# Patient Record
Sex: FEMALE | Race: WHITE | NOT HISPANIC OR LATINO
[De-identification: names, ages, dates, MRNs, and addresses within clinical notes are randomized per-mention and may not be internally consistent; named-entity substitution may affect disease eponyms.]

---

## 2019-06-05 ENCOUNTER — APPOINTMENT (OUTPATIENT)
Dept: PODIATRY | Facility: CLINIC | Age: 74
End: 2019-06-05
Payer: MEDICARE

## 2019-06-05 VITALS
HEIGHT: 62 IN | WEIGHT: 120 LBS | SYSTOLIC BLOOD PRESSURE: 134 MMHG | BODY MASS INDEX: 22.08 KG/M2 | DIASTOLIC BLOOD PRESSURE: 70 MMHG

## 2019-06-05 DIAGNOSIS — M24.575 CONTRACTURE, LEFT FOOT: ICD-10-CM

## 2019-06-05 DIAGNOSIS — M20.12 HALLUX VALGUS (ACQUIRED), LEFT FOOT: ICD-10-CM

## 2019-06-05 DIAGNOSIS — M20.42 OTHER HAMMER TOE(S) (ACQUIRED), LEFT FOOT: ICD-10-CM

## 2019-06-05 DIAGNOSIS — M24.574 CONTRACTURE, RIGHT FOOT: ICD-10-CM

## 2019-06-05 DIAGNOSIS — M20.11 HALLUX VALGUS (ACQUIRED), RIGHT FOOT: ICD-10-CM

## 2019-06-05 DIAGNOSIS — M20.41 OTHER HAMMER TOE(S) (ACQUIRED), RIGHT FOOT: ICD-10-CM

## 2019-06-05 PROBLEM — Z00.00 ENCOUNTER FOR PREVENTIVE HEALTH EXAMINATION: Status: ACTIVE | Noted: 2019-06-05

## 2019-06-05 PROCEDURE — 99203 OFFICE O/P NEW LOW 30 MIN: CPT

## 2019-06-05 RX ORDER — TIOTROPIUM BROMIDE 18 UG/1
18 CAPSULE ORAL; RESPIRATORY (INHALATION)
Qty: 90 | Refills: 0 | Status: ACTIVE | COMMUNITY
Start: 2019-03-04

## 2019-06-05 RX ORDER — TRAZODONE HYDROCHLORIDE 50 MG/1
50 TABLET ORAL
Qty: 30 | Refills: 0 | Status: ACTIVE | COMMUNITY
Start: 2019-05-02

## 2019-06-05 RX ORDER — CARBIDOPA AND LEVODOPA 25; 100 MG/1; MG/1
25-100 TABLET ORAL
Qty: 270 | Refills: 0 | Status: ACTIVE | COMMUNITY
Start: 2019-04-30

## 2019-06-05 RX ORDER — MONTELUKAST 10 MG/1
10 TABLET, FILM COATED ORAL
Qty: 30 | Refills: 0 | Status: ACTIVE | COMMUNITY
Start: 2019-03-04

## 2019-06-05 RX ORDER — ALBUTEROL SULFATE 2.5 MG/3ML
(2.5 MG/3ML) SOLUTION RESPIRATORY (INHALATION)
Qty: 525 | Refills: 0 | Status: ACTIVE | COMMUNITY
Start: 2018-06-11

## 2019-06-05 RX ORDER — ALENDRONATE SODIUM 70 MG/1
70 TABLET ORAL
Qty: 12 | Refills: 0 | Status: ACTIVE | COMMUNITY
Start: 2019-05-23

## 2019-06-05 RX ORDER — AZITHROMYCIN 250 MG/1
250 TABLET, FILM COATED ORAL
Qty: 5 | Refills: 0 | Status: ACTIVE | COMMUNITY
Start: 2019-02-16

## 2019-06-05 NOTE — PROCEDURE
[FreeTextEntry1] : had a lengthy discussion with the patient regarding the diagnosis etiology and differential diagnosis as well as treatment options for the presenting problem. Risks alternatives and benefits of treatment ranging from conservative to surgical explained in great detail. I also explained the progression of treatment from conservative to possible surgical treatment options as well as the benefits of each. I do stress conservative treatment if in fact conservative treatment is an option until it no longer provides relief. Over-the-counter products medications padding, and splinting were reviewed as well. All questions asked and answered appropriately to the patient's satisfaction\par I had a lengthy discussion with the patient regarding surgical as well as conservative options. Conservative options included anti-inflammatory medication both over-the-counter and prescription. Other conservative measures including alteration of shoe gear as well as pads to go over the painful areas. Silipos shields were supplied for all bony pressure areas of pain. I then had a lengthy discussion with the patient regarding surgical intervention. I used x-rays bone models an erasable wax board and their own foot in the discussion. Educational literature was also dispensed. I explained surgical intervention as well as the risks alternatives and benefits to surgical intervention. I explained that it is elective surgery and the decision to continue would be theirs. I also explained that there is an option of no surgery and all. During my surgical evaluation and consultation, I explained the need for cutting and removal and possibly repositioning of bone. I discussed the difference between simply cutting bone and removing the painful bone and in the case of more severe deformities there is also the option of cutting repositioning and fixating the cut bone with either wire screw or staple or a combination of both 3. I explained to the patient that these decisions are usually made during the surgery however I explained to the patient given my findings today the anticipated but not definitive surgical procedure. I explained to the patient there are always choice is to be made during the surgery that sometimes we cannot predict prior to the surgery. Some of the risks explained were, but not limited to, infection recurrence of deformity chronic pain chronic swelling nerve injury injury to the blood vessels as well as the possible need for further surgery should the original procedure not entirely correct or overcorrect the problem. I explained the post operative course as well as the need to be in either a special surgical boot or a surgical shoe postop. I explained to the patient that the duration of time would be approximately 4-6 weeks and sometimes longer in this special type of postop shoe. I also explained to the patient that there would be a need for physical therapy after the surgery in order to decrease swelling and help increase and improve range of motion at the surgical sites. Postoperative compliance regarding dressing changes the type of shoes that they should be wearing compliance with physical therapy as well as keeping all postop appointments were stressed on several occasions. I explained to the patient there is no way to tell preoperatively how much swelling and pain it will have postop and this will directly relate to when they can get back into a shoe comfortably\par The patient was also told that formal Physical therapy will be required post op.\par The patient was told that in some cases wires/pins need to be implanted which can either be permanent or removable however the first choice  is always compression screws but when this is not possible either to poor bone bone stock or other unforeseen findings intraoperatively that K wire fixation is there is a possibility and this could delay healing. The patient was also to some cases been anticipated they will be required to use crutches postop. The patient was told that there is some intra-operative instances where they cannot be predicted preoperatively but there is a possibility.\par I Had a lengthy discussion with the patient today regarding the etiology his as well as treatment options for hammertoe deformity both flexible and rigid. At this point in time I feel it is totally acceptable to consider a less aggressive approach and to do an open tenotomy through a small incision on the dorsum of the foot which involves no cutting of bone is a simple soft tissue correction which can lower the toe.  I had a lengthy discussion with the patient regarding the risks and benefits of such a procedure including the similar risks of more aggressive hammer toe surgery with resection of the offending proximal phalanx down the road if this conservative approach fails to heal the problem. I did explain the risks and benefits of this type of surgery which are very much similar to any other type of surgical procedure. Include infection, recurrence of deformity, the condition no better, infection that could require long-term antibiotics, and infection that could require hospitalization and infection getting worse and affecting the bone and in some rare cases could result in the patient could lose the toe. All questions asked and answered appropriately\par

## 2019-06-05 NOTE — HISTORY OF PRESENT ILLNESS
[FreeTextEntry1] : Location: 2nd toe right foot\par Duration: a few months\par Acute: yes\par Chronic:\par Past Tx: has had sx repair of 2nd toe right foot--16 yrs ago\par Exacerbated by: walking and closed shoes\par \par \par

## 2019-06-05 NOTE — REVIEW OF SYSTEMS
[Shortness Of Breath] : shortness of breath [Wheezing] : wheezing [As Noted in HPI] : as noted in HPI [Joint Pain] : joint pain [Joint Swelling] : joint swelling [Joint Stiffness] : joint stiffness [Negative] : Integumentary [Cough] : no cough [Skin Lesions] : no skin lesions [Skin Wound] : no skin wound [FreeTextEntry6] : COPD [FreeTextEntry9] : 2nd toe right foot and lesser to 3/4 right

## 2019-06-05 NOTE — PHYSICAL EXAM
[General Appearance - Alert] : alert [General Appearance - In No Acute Distress] : in no acute distress [Full Pulse] : the pedal pulses are present [Edema] : there was no peripheral edema [Skin Color & Pigmentation] : normal skin color and pigmentation [Skin Turgor] : normal skin turgor [] : no rash [Deep Tendon Reflexes (DTR)] : deep tendon reflexes were 2+ and symmetric [Sensation] : the sensory exam was normal to light touch and pinprick [No Focal Deficits] : no focal deficits [Oriented To Time, Place, And Person] : oriented to person, place, and time [Impaired Insight] : insight and judgment were intact [Affect] : the affect was normal [FreeTextEntry1] : 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

## 2023-01-23 ENCOUNTER — TRANSCRIPTION ENCOUNTER (OUTPATIENT)
Age: 78
End: 2023-01-23

## 2023-01-24 ENCOUNTER — TRANSCRIPTION ENCOUNTER (OUTPATIENT)
Age: 78
End: 2023-01-24

## 2023-08-14 ENCOUNTER — TRANSCRIPTION ENCOUNTER (OUTPATIENT)
Age: 78
End: 2023-08-14

## 2023-09-22 ENCOUNTER — APPOINTMENT (OUTPATIENT)
Dept: THORACIC SURGERY | Facility: CLINIC | Age: 78
End: 2023-09-22
Payer: MEDICARE

## 2023-09-22 PROCEDURE — 99204 OFFICE O/P NEW MOD 45 MIN: CPT

## 2023-12-15 ENCOUNTER — APPOINTMENT (OUTPATIENT)
Dept: THORACIC SURGERY | Facility: CLINIC | Age: 78
End: 2023-12-15
Payer: MEDICARE

## 2023-12-15 PROCEDURE — 99213 OFFICE O/P EST LOW 20 MIN: CPT

## 2024-02-01 ENCOUNTER — TRANSCRIPTION ENCOUNTER (OUTPATIENT)
Age: 79
End: 2024-02-01

## 2024-02-05 ENCOUNTER — APPOINTMENT (OUTPATIENT)
Dept: PULMONOLOGY | Facility: CLINIC | Age: 79
End: 2024-02-05
Payer: MEDICARE

## 2024-02-05 VITALS
DIASTOLIC BLOOD PRESSURE: 72 MMHG | HEIGHT: 62 IN | SYSTOLIC BLOOD PRESSURE: 136 MMHG | WEIGHT: 117 LBS | BODY MASS INDEX: 21.53 KG/M2 | OXYGEN SATURATION: 94 % | HEART RATE: 65 BPM

## 2024-02-05 DIAGNOSIS — G47.01 INSOMNIA DUE TO MEDICAL CONDITION: ICD-10-CM

## 2024-02-05 PROCEDURE — 99214 OFFICE O/P EST MOD 30 MIN: CPT

## 2024-02-05 RX ORDER — ALPRAZOLAM 0.25 MG/1
0.25 TABLET ORAL
Qty: 5 | Refills: 0 | Status: ACTIVE | COMMUNITY
Start: 2024-02-05 | End: 1900-01-01

## 2024-02-05 NOTE — PHYSICAL EXAM
[No Acute Distress] : no acute distress [Normal Oropharynx] : normal oropharynx [Normal Appearance] : normal appearance [No Neck Mass] : no neck mass [Normal Rate/Rhythm] : normal rate/rhythm [Normal S1, S2] : normal s1, s2 [No Murmurs] : no murmurs [No Resp Distress] : no resp distress [No Abnormalities] : no abnormalities [Benign] : benign [Normal Gait] : normal gait [No Clubbing] : no clubbing [No Cyanosis] : no cyanosis [FROM] : FROM [No Focal Deficits] : no focal deficits [Oriented x3] : oriented x3 [Normal Affect] : normal affect [Clear to Auscultation Bilaterally] : clear to auscultation bilaterally [1+ Pitting] : 1+ pitting [TextBox_105] : chronic skin changes (hyperpigmentation) in LE's bilaterally

## 2024-02-05 NOTE — HISTORY OF PRESENT ILLNESS
[TextBox_4] : 79 yo F w/ severe COPD on home oxygen PRN (although not using it most of the time), Parkinson's disease (mild), DVT s/p thrombectomy not on a/c, allergic asthma, with recent admission for COPD exacrbation at [facility] on 1/26/24-2/1/24, presenting to establish care. Former smoking history of ~50ppy and followed with another outpatient pulmonologist, which she decided to switch from. Current regimen includes nocturnal and prn oxygen use, albuterol prn, flovent, and stiolto. This was her 3rd exacerbation in 6-10months or so, which have improved each time with prednisone.  Since her discharge, she has been taking her prednisone since discharge on 2/1/24 and feels much better although with lingering cough (less sputum production and dyspnea overall).

## 2024-02-05 NOTE — ASSESSMENT
[FreeTextEntry1] : 77 yo F w/ above hx including COPD and pulmonary nodules, presenting for establishment of care after COPD exacerbation  COPD - Spiriva added to symbicort - PFT pending. No desats on 6MWT today - nocturnal oxygen to continue for now and re-assess later - Pulmonary rehab - stopped systemic steroids as she is symptomatically improving and has insomnia with LE swelling likely from the prednisone use.   Pulmonary nodule - follow up scan scheduled for later this month. Intervention to be discussed then. Notably MYCHAL 18x9.5mm nodule same size on 9/2023 in comparison to 7/23/23 PET but PET + on 7/23/23 scan. No consitutional symptoms

## 2024-02-21 ENCOUNTER — NON-APPOINTMENT (OUTPATIENT)
Age: 79
End: 2024-02-21

## 2024-03-22 ENCOUNTER — APPOINTMENT (OUTPATIENT)
Dept: THORACIC SURGERY | Facility: CLINIC | Age: 79
End: 2024-03-22
Payer: MEDICARE

## 2024-03-22 PROCEDURE — 99213 OFFICE O/P EST LOW 20 MIN: CPT

## 2024-03-25 ENCOUNTER — APPOINTMENT (OUTPATIENT)
Dept: PULMONOLOGY | Facility: CLINIC | Age: 79
End: 2024-03-25
Payer: MEDICARE

## 2024-03-25 VITALS
WEIGHT: 115 LBS | HEART RATE: 80 BPM | SYSTOLIC BLOOD PRESSURE: 128 MMHG | OXYGEN SATURATION: 94 % | HEIGHT: 62 IN | DIASTOLIC BLOOD PRESSURE: 82 MMHG | BODY MASS INDEX: 21.16 KG/M2

## 2024-03-25 DIAGNOSIS — R91.1 SOLITARY PULMONARY NODULE: ICD-10-CM

## 2024-03-25 DIAGNOSIS — J44.9 CHRONIC OBSTRUCTIVE PULMONARY DISEASE, UNSPECIFIED: ICD-10-CM

## 2024-03-25 PROCEDURE — 99213 OFFICE O/P EST LOW 20 MIN: CPT

## 2024-03-25 PROCEDURE — G2211 COMPLEX E/M VISIT ADD ON: CPT

## 2024-03-25 NOTE — HISTORY OF PRESENT ILLNESS
[TextBox_4] : 79 yo F w/ severe COPD on home oxygen PRN (although not using it most of the time), Parkinson's disease (mild), DVT s/p thrombectomy not on a/c, allergic asthma, with recent admission for COPD exacrbation at [facility] on 1/26/24-2/1/24, presenting to establish care. Former smoking history of ~50ppy and followed with another outpatient pulmonologist, which she decided to switch from. Current regimen includes nocturnal and prn oxygen use, albuterol prn, flovent, and stiolto. This was her 3rd exacerbation in 6-10months or so, which have improved each time with prednisone.  Since her discharge, she has been taking her prednisone since discharge on 2/1/24 and feels much better although with lingering cough (less sputum production and dyspnea overall).   3/25/24 Patient presents for follow-up.  Respiratory symptoms remain stable.  She has used albuterol once every 3 days or so, but believes that the respiratory symptoms of shortness of breath is more related to her anxiety.  There is no increased wheezing or coughing

## 2024-03-25 NOTE — ASSESSMENT
[FreeTextEntry1] : 79 yo F w/ above hx including COPD and pulmonary nodules, presenting for establishment of care after COPD exacerbation  COPD - Continue with Spiriva, Symbicort, albuterol as needed.  Azithromycin 3 times weekly to continue as well - PFT 3/11/24 showed m oderate obstruction with an FVC response of  27% to bronchodilator. Lung volumes and DLCO are within normal limits.  - nocturnal oxygen to continue for now and re-assess later - Pulmonary rehab to start. - emergency prednisone dose provided. She will call if she starts to take it.   Pulmonary nodule -  Follow-up CT scan done within the last month showed no change in her pulmonary nodule. MYCHAL 18x9.5mm nodule same size on 9/2023 in comparison to 7/23/23 PET but PET + on 7/23/23 scan. No constitutional symptoms. Plan for repeat CAT scan in 6 months to be done at [facility]  f/u in 6 months. Sooner if needed

## 2024-03-25 NOTE — PHYSICAL EXAM
[No Acute Distress] : no acute distress [Normal Oropharynx] : normal oropharynx [Normal Appearance] : normal appearance [No Neck Mass] : no neck mass [Normal Rate/Rhythm] : normal rate/rhythm [Normal S1, S2] : normal s1, s2 [No Murmurs] : no murmurs [No Resp Distress] : no resp distress [Clear to Auscultation Bilaterally] : clear to auscultation bilaterally [No Abnormalities] : no abnormalities [Benign] : benign [Normal Gait] : normal gait [No Clubbing] : no clubbing [No Cyanosis] : no cyanosis [FROM] : FROM [1+ Pitting] : 1+ pitting [Oriented x3] : oriented x3 [No Focal Deficits] : no focal deficits [Normal Affect] : normal affect [TextBox_105] : chronic skin changes (hyperpigmentation) in LE's bilaterally

## 2024-04-04 RX ORDER — TIOTROPIUM BROMIDE INHALATION SPRAY 3.12 UG/1
2.5 SPRAY, METERED RESPIRATORY (INHALATION) DAILY
Qty: 1 | Refills: 9 | Status: ACTIVE | COMMUNITY
Start: 2024-02-05 | End: 1900-01-01

## 2024-05-10 ENCOUNTER — TRANSCRIPTION ENCOUNTER (OUTPATIENT)
Age: 79
End: 2024-05-10

## 2024-05-10 DIAGNOSIS — S06.6XAA TRAUMATIC SUBARACHNOID HEMORRHAGE WITH LOSS OF CONSCIOUSNESS STATUS UNKNOWN, INITIAL ENCOUNTER: ICD-10-CM

## 2024-05-14 ENCOUNTER — APPOINTMENT (OUTPATIENT)
Dept: SURGERY | Facility: CLINIC | Age: 79
End: 2024-05-14

## 2024-05-21 ENCOUNTER — APPOINTMENT (OUTPATIENT)
Dept: SURGERY | Facility: CLINIC | Age: 79
End: 2024-05-21

## 2024-05-23 ENCOUNTER — APPOINTMENT (OUTPATIENT)
Dept: NEUROSURGERY | Facility: CLINIC | Age: 79
End: 2024-05-23

## 2024-05-28 ENCOUNTER — APPOINTMENT (OUTPATIENT)
Dept: SURGERY | Facility: CLINIC | Age: 79
End: 2024-05-28

## 2024-06-04 ENCOUNTER — APPOINTMENT (OUTPATIENT)
Dept: SURGERY | Facility: CLINIC | Age: 79
End: 2024-06-04

## 2024-06-11 ENCOUNTER — APPOINTMENT (OUTPATIENT)
Dept: SURGERY | Facility: CLINIC | Age: 79
End: 2024-06-11

## 2024-06-21 RX ORDER — BUDESONIDE AND FORMOTEROL FUMARATE DIHYDRATE 160; 4.5 UG/1; UG/1
160-4.5 AEROSOL RESPIRATORY (INHALATION)
Qty: 1 | Refills: 10 | Status: ACTIVE | COMMUNITY
Start: 2019-03-04 | End: 1900-01-01

## 2024-06-21 RX ORDER — PREDNISONE 20 MG/1
20 TABLET ORAL
Qty: 14 | Refills: 0 | Status: DISCONTINUED | COMMUNITY
Start: 2019-02-16 | End: 2024-06-21

## 2024-06-21 RX ORDER — PREDNISONE 20 MG/1
20 TABLET ORAL
Qty: 14 | Refills: 0 | Status: DISCONTINUED | COMMUNITY
Start: 2024-03-25 | End: 2024-06-21

## 2024-06-21 RX ORDER — ROFLUMILAST 500 UG/1
500 TABLET ORAL DAILY
Qty: 90 | Refills: 3 | Status: DISCONTINUED | COMMUNITY
Start: 2024-02-05 | End: 2024-06-21

## 2024-07-03 DIAGNOSIS — J01.90 ACUTE SINUSITIS, UNSPECIFIED: ICD-10-CM

## 2024-07-03 RX ORDER — PREDNISONE 20 MG/1
20 TABLET ORAL
Qty: 14 | Refills: 0 | Status: ACTIVE | COMMUNITY
Start: 2024-07-03 | End: 1900-01-01

## 2024-07-03 RX ORDER — AMOXICILLIN AND CLAVULANATE POTASSIUM 500; 125 MG/1; MG/1
500-125 TABLET, FILM COATED ORAL 3 TIMES DAILY
Qty: 21 | Refills: 0 | Status: ACTIVE | COMMUNITY
Start: 2024-07-03 | End: 1900-01-01

## 2024-08-15 RX ORDER — ALBUTEROL SULFATE 90 UG/1
108 (90 BASE) INHALANT RESPIRATORY (INHALATION) EVERY 6 HOURS
Qty: 3 | Refills: 9 | Status: ACTIVE | COMMUNITY
Start: 2024-08-15 | End: 1900-01-01

## 2024-08-23 ENCOUNTER — APPOINTMENT (OUTPATIENT)
Dept: PULMONOLOGY | Facility: CLINIC | Age: 79
End: 2024-08-23
Payer: MEDICARE

## 2024-08-23 VITALS
WEIGHT: 110.25 LBS | SYSTOLIC BLOOD PRESSURE: 138 MMHG | DIASTOLIC BLOOD PRESSURE: 80 MMHG | HEIGHT: 62 IN | BODY MASS INDEX: 20.29 KG/M2 | HEART RATE: 82 BPM | OXYGEN SATURATION: 94 %

## 2024-08-23 DIAGNOSIS — R91.1 SOLITARY PULMONARY NODULE: ICD-10-CM

## 2024-08-23 DIAGNOSIS — J44.9 CHRONIC OBSTRUCTIVE PULMONARY DISEASE, UNSPECIFIED: ICD-10-CM

## 2024-08-23 PROCEDURE — 99213 OFFICE O/P EST LOW 20 MIN: CPT

## 2024-08-23 RX ORDER — ROFLUMILAST 500 UG/1
500 TABLET ORAL DAILY
Qty: 30 | Refills: 10 | Status: ACTIVE | COMMUNITY
Start: 2024-08-23 | End: 1900-01-01

## 2024-08-23 NOTE — ASSESSMENT
[FreeTextEntry1] : 79 yo F w/ above hx including COPD and pulmonary nodules, presenting for follow up  COPD Pulmonary nodule  - Continue with Spiriva, Symbicort, albuterol as needed.  Roflumilast to also continue. She has had some weight loss over the past few months which may be related to roflumilast so asked to check daily. If significant decrement, will stop.  - PFT 3/11/24 showed moderate obstruction with an FVC response of 27% to bronchodilator. Lung volumes and DLCO are within normal limits.  - nocturnal oxygen to continue for now and re-assess later - Pulmonary rehab to resume once cleared by physical therapy. -  Follow-up CT scan done a few days ago at [facility]. Will try to obtain imaging. Prior CT showed no change in her pulmonary nodule. MYCHAL 18x9.5mm nodule same size on 9/2023 in comparison to 7/23/23 PET but PET + on 7/23/23 scan. No constitutional symptoms still but does have some weight loss as mentioned above.  Will call after receiving CT results. follow up in 6 months otherwise

## 2024-08-23 NOTE — PHYSICAL EXAM
[No Acute Distress] : no acute distress [Normal Oropharynx] : normal oropharynx [Normal Appearance] : normal appearance [No Neck Mass] : no neck mass [Normal Rate/Rhythm] : normal rate/rhythm [Normal S1, S2] : normal s1, s2 [No Murmurs] : no murmurs [No Resp Distress] : no resp distress [Clear to Auscultation Bilaterally] : clear to auscultation bilaterally [No Abnormalities] : no abnormalities [Benign] : benign [Normal Gait] : normal gait [No Clubbing] : no clubbing [No Cyanosis] : no cyanosis [FROM] : FROM [1+ Pitting] : 1+ pitting [No Focal Deficits] : no focal deficits [Oriented x3] : oriented x3 [Normal Affect] : normal affect [TextBox_105] : chronic skin changes (hyperpigmentation) in LE's bilaterally

## 2024-08-23 NOTE — HISTORY OF PRESENT ILLNESS
[TextBox_4] : 79 yo F w/ severe COPD on home oxygen PRN (although not using it most of the time), Parkinson's disease (mild), DVT s/p thrombectomy not on a/c, allergic asthma, with recent admission for COPD exacrbation at [facility] on 1/26/24-2/1/24, presenting to establish care. Former smoking history of ~50ppy and followed with another outpatient pulmonologist, which she decided to switch from. Current regimen includes nocturnal and prn oxygen use, albuterol prn, flovent, and stiolto. This was her 3rd exacerbation in 6-10months or so, which have improved each time with prednisone.  Since her discharge, she has been taking her prednisone since discharge on 2/1/24 and feels much better although with lingering cough (less sputum production and dyspnea overall).   3/25/24 Patient presents for follow-up.  Respiratory symptoms remain stable.  She has used albuterol once every 3 days or so, but believes that the respiratory symptoms of shortness of breath is more related to her anxiety.  There is no increased wheezing or coughing  8/23/24 Patient presents for follow-up.  In the interim, had 2 episodes of fall resulting in lower extremity and hip injury (no surgery, follows up with orthopedics and wound care).  Respiratory status remained stable

## 2024-08-23 NOTE — ASSESSMENT
[FreeTextEntry1] : 77 yo F w/ above hx including COPD and pulmonary nodules, presenting for follow up  COPD Pulmonary nodule  - Continue with Spiriva, Symbicort, albuterol as needed.  Roflumilast to also continue. She has had some weight loss over the past few months which may be related to roflumilast so asked to check daily. If significant decrement, will stop.  - PFT 3/11/24 showed moderate obstruction with an FVC response of 27% to bronchodilator. Lung volumes and DLCO are within normal limits.  - nocturnal oxygen to continue for now and re-assess later - Pulmonary rehab to resume once cleared by physical therapy. -  Follow-up CT scan done a few days ago at [facility]. Will try to obtain imaging. Prior CT showed no change in her pulmonary nodule. MYCHAL 18x9.5mm nodule same size on 9/2023 in comparison to 7/23/23 PET but PET + on 7/23/23 scan. No constitutional symptoms still but does have some weight loss as mentioned above.  Will call after receiving CT results. follow up in 6 months otherwise

## 2024-08-23 NOTE — HISTORY OF PRESENT ILLNESS
[TextBox_4] : 79 yo F w/ severe COPD on home oxygen PRN (although not using it most of the time), Parkinson's disease (mild), DVT s/p thrombectomy not on a/c, allergic asthma, with recent admission for COPD exacrbation at [facility] on 1/26/24-2/1/24, presenting to establish care. Former smoking history of ~50ppy and followed with another outpatient pulmonologist, which she decided to switch from. Current regimen includes nocturnal and prn oxygen use, albuterol prn, flovent, and stiolto. This was her 3rd exacerbation in 6-10months or so, which have improved each time with prednisone.  Since her discharge, she has been taking her prednisone since discharge on 2/1/24 and feels much better although with lingering cough (less sputum production and dyspnea overall).   3/25/24 Patient presents for follow-up.  Respiratory symptoms remain stable.  She has used albuterol once every 3 days or so, but believes that the respiratory symptoms of shortness of breath is more related to her anxiety.  There is no increased wheezing or coughing  8/23/24 Patient presents for follow-up.  In the interim, had 2 episodes of fall resulting in lower extremity and hip injury (no surgery, follows up with orthopedics and wound care).  Respiratory status remained stable

## 2024-09-08 ENCOUNTER — RESULT REVIEW (OUTPATIENT)
Age: 79
End: 2024-09-08

## 2024-11-06 ENCOUNTER — APPOINTMENT (OUTPATIENT)
Dept: PULMONOLOGY | Facility: CLINIC | Age: 79
End: 2024-11-06

## 2024-11-06 RX ORDER — AZITHROMYCIN 250 MG/1
250 TABLET, FILM COATED ORAL
Qty: 1 | Refills: 0 | Status: ACTIVE | COMMUNITY
Start: 2024-11-06 | End: 1900-01-01

## 2024-12-11 ENCOUNTER — RX RENEWAL (OUTPATIENT)
Age: 79
End: 2024-12-11

## 2025-01-17 ENCOUNTER — APPOINTMENT (OUTPATIENT)
Dept: PULMONOLOGY | Facility: CLINIC | Age: 80
End: 2025-01-17
Payer: MEDICARE

## 2025-01-17 DIAGNOSIS — J01.90 ACUTE SINUSITIS, UNSPECIFIED: ICD-10-CM

## 2025-01-17 PROCEDURE — 99213 OFFICE O/P EST LOW 20 MIN: CPT

## 2025-01-17 RX ORDER — PREDNISONE 20 MG/1
20 TABLET ORAL DAILY
Qty: 5 | Refills: 0 | Status: ACTIVE | COMMUNITY
Start: 2025-01-17 | End: 1900-01-01

## 2025-01-17 RX ORDER — AZITHROMYCIN 250 MG/1
250 TABLET, FILM COATED ORAL
Qty: 1 | Refills: 0 | Status: ACTIVE | COMMUNITY
Start: 2025-01-17 | End: 1900-01-01

## 2025-01-27 ENCOUNTER — RX RENEWAL (OUTPATIENT)
Age: 80
End: 2025-01-27

## 2025-03-05 ENCOUNTER — APPOINTMENT (OUTPATIENT)
Dept: PULMONOLOGY | Facility: CLINIC | Age: 80
End: 2025-03-05
Payer: MEDICARE

## 2025-03-05 VITALS
DIASTOLIC BLOOD PRESSURE: 72 MMHG | SYSTOLIC BLOOD PRESSURE: 132 MMHG | BODY MASS INDEX: 19.69 KG/M2 | HEART RATE: 103 BPM | OXYGEN SATURATION: 93 % | HEIGHT: 62 IN | WEIGHT: 107 LBS

## 2025-03-05 DIAGNOSIS — R91.1 SOLITARY PULMONARY NODULE: ICD-10-CM

## 2025-03-05 DIAGNOSIS — G47.01 INSOMNIA DUE TO MEDICAL CONDITION: ICD-10-CM

## 2025-03-05 DIAGNOSIS — J44.9 CHRONIC OBSTRUCTIVE PULMONARY DISEASE, UNSPECIFIED: ICD-10-CM

## 2025-03-05 DIAGNOSIS — Z87.09 PERSONAL HISTORY OF OTHER DISEASES OF THE RESPIRATORY SYSTEM: ICD-10-CM

## 2025-03-05 PROCEDURE — 99214 OFFICE O/P EST MOD 30 MIN: CPT

## 2025-03-05 RX ORDER — PREDNISONE 20 MG/1
20 TABLET ORAL
Qty: 60 | Refills: 0 | Status: ACTIVE | COMMUNITY
Start: 2025-03-05 | End: 1900-01-01

## 2025-03-31 ENCOUNTER — APPOINTMENT (OUTPATIENT)
Dept: PULMONOLOGY | Facility: CLINIC | Age: 80
End: 2025-03-31

## 2025-03-31 DIAGNOSIS — R91.1 SOLITARY PULMONARY NODULE: ICD-10-CM

## 2025-03-31 DIAGNOSIS — J44.9 CHRONIC OBSTRUCTIVE PULMONARY DISEASE, UNSPECIFIED: ICD-10-CM

## 2025-03-31 PROCEDURE — 99213 OFFICE O/P EST LOW 20 MIN: CPT | Mod: 93

## 2025-03-31 RX ORDER — AZITHROMYCIN 500 MG/1
500 TABLET, FILM COATED ORAL
Qty: 3 | Refills: 3 | Status: ACTIVE | COMMUNITY
Start: 2025-03-31 | End: 1900-01-01

## 2025-03-31 RX ORDER — PREDNISONE 10 MG/1
10 TABLET ORAL
Qty: 30 | Refills: 0 | Status: ACTIVE | COMMUNITY
Start: 2025-03-31 | End: 1900-01-01

## 2025-04-24 ENCOUNTER — TRANSCRIPTION ENCOUNTER (OUTPATIENT)
Age: 80
End: 2025-04-24

## 2025-05-05 ENCOUNTER — APPOINTMENT (OUTPATIENT)
Dept: PULMONOLOGY | Facility: CLINIC | Age: 80
End: 2025-05-05
Payer: MEDICARE

## 2025-05-05 VITALS
DIASTOLIC BLOOD PRESSURE: 70 MMHG | TEMPERATURE: 98.3 F | HEART RATE: 101 BPM | OXYGEN SATURATION: 92 % | SYSTOLIC BLOOD PRESSURE: 140 MMHG

## 2025-05-05 DIAGNOSIS — J44.9 CHRONIC OBSTRUCTIVE PULMONARY DISEASE, UNSPECIFIED: ICD-10-CM

## 2025-05-05 PROCEDURE — 99214 OFFICE O/P EST MOD 30 MIN: CPT

## 2025-05-21 ENCOUNTER — APPOINTMENT (OUTPATIENT)
Dept: PULMONOLOGY | Facility: CLINIC | Age: 80
End: 2025-05-21
Payer: MEDICARE

## 2025-05-21 DIAGNOSIS — G47.01 INSOMNIA DUE TO MEDICAL CONDITION: ICD-10-CM

## 2025-05-21 DIAGNOSIS — R91.1 SOLITARY PULMONARY NODULE: ICD-10-CM

## 2025-05-21 DIAGNOSIS — J44.1 CHRONIC OBSTRUCTIVE PULMONARY DISEASE WITH (ACUTE) EXACERBATION: ICD-10-CM

## 2025-05-21 DIAGNOSIS — B37.0 CANDIDAL STOMATITIS: ICD-10-CM

## 2025-05-21 DIAGNOSIS — J44.9 CHRONIC OBSTRUCTIVE PULMONARY DISEASE, UNSPECIFIED: ICD-10-CM

## 2025-05-21 PROCEDURE — G2211 COMPLEX E/M VISIT ADD ON: CPT

## 2025-05-21 PROCEDURE — 99214 OFFICE O/P EST MOD 30 MIN: CPT

## 2025-05-21 RX ORDER — TIOTROPIUM BROMIDE AND OLODATEROL 3.124; 2.736 UG/1; UG/1
2.5-2.5 SPRAY, METERED RESPIRATORY (INHALATION)
Qty: 1 | Refills: 5 | Status: ACTIVE | COMMUNITY
Start: 2025-05-21 | End: 1900-01-01

## 2025-05-21 RX ORDER — NYSTATIN 100000 [USP'U]/ML
100000 SUSPENSION ORAL 4 TIMES DAILY
Qty: 1 | Refills: 0 | Status: ACTIVE | COMMUNITY
Start: 2025-05-21 | End: 1900-01-01

## 2025-05-21 RX ORDER — IPRATROPIUM BROMIDE AND ALBUTEROL SULFATE 2.5; .5 MG/3ML; MG/3ML
0.5-2.5 (3) SOLUTION RESPIRATORY (INHALATION) 4 TIMES DAILY
Qty: 1 | Refills: 7 | Status: ACTIVE | COMMUNITY
Start: 2025-05-21 | End: 1900-01-01

## 2025-05-21 RX ORDER — PREDNISONE 20 MG/1
20 TABLET ORAL
Qty: 14 | Refills: 0 | Status: ACTIVE | COMMUNITY
Start: 2025-05-21 | End: 1900-01-01

## 2025-05-29 DIAGNOSIS — R09.02 HYPOXEMIA: ICD-10-CM

## 2025-06-11 ENCOUNTER — RESULT REVIEW (OUTPATIENT)
Age: 80
End: 2025-06-11

## 2025-07-08 RX ORDER — AMOXICILLIN AND CLAVULANATE POTASSIUM 875; 125 MG/1; MG/1
875-125 TABLET, COATED ORAL
Qty: 14 | Refills: 1 | Status: ACTIVE | COMMUNITY
Start: 2025-07-08 | End: 1900-01-01

## 2025-07-08 RX ORDER — AZITHROMYCIN 500 MG/1
500 TABLET, FILM COATED ORAL
Qty: 3 | Refills: 3 | Status: ACTIVE | COMMUNITY
Start: 2025-07-08 | End: 1900-01-01

## 2025-07-10 ENCOUNTER — APPOINTMENT (OUTPATIENT)
Dept: PULMONOLOGY | Facility: CLINIC | Age: 80
End: 2025-07-10

## 2025-07-31 ENCOUNTER — APPOINTMENT (OUTPATIENT)
Dept: PULMONOLOGY | Facility: CLINIC | Age: 80
End: 2025-07-31

## 2025-07-31 DIAGNOSIS — J44.9 CHRONIC OBSTRUCTIVE PULMONARY DISEASE, UNSPECIFIED: ICD-10-CM

## 2025-08-06 ENCOUNTER — APPOINTMENT (OUTPATIENT)
Dept: PULMONOLOGY | Facility: CLINIC | Age: 80
End: 2025-08-06

## 2025-08-11 ENCOUNTER — RESULT REVIEW (OUTPATIENT)
Age: 80
End: 2025-08-11

## 2025-08-19 ENCOUNTER — APPOINTMENT (OUTPATIENT)
Dept: PULMONOLOGY | Facility: CLINIC | Age: 80
End: 2025-08-19
Payer: MEDICARE

## 2025-08-19 ENCOUNTER — APPOINTMENT (OUTPATIENT)
Dept: PULMONOLOGY | Facility: CLINIC | Age: 80
End: 2025-08-19

## 2025-08-19 VITALS
BODY MASS INDEX: 20.62 KG/M2 | HEART RATE: 80 BPM | RESPIRATION RATE: 20 BRPM | WEIGHT: 105 LBS | HEIGHT: 60 IN | DIASTOLIC BLOOD PRESSURE: 92 MMHG | SYSTOLIC BLOOD PRESSURE: 184 MMHG | OXYGEN SATURATION: 95 % | TEMPERATURE: 98.2 F

## 2025-08-19 DIAGNOSIS — R91.1 SOLITARY PULMONARY NODULE: ICD-10-CM

## 2025-08-19 DIAGNOSIS — J44.9 CHRONIC OBSTRUCTIVE PULMONARY DISEASE, UNSPECIFIED: ICD-10-CM

## 2025-08-19 DIAGNOSIS — R09.02 HYPOXEMIA: ICD-10-CM

## 2025-08-19 DIAGNOSIS — J44.1 CHRONIC OBSTRUCTIVE PULMONARY DISEASE WITH (ACUTE) EXACERBATION: ICD-10-CM

## 2025-08-19 PROCEDURE — 99214 OFFICE O/P EST MOD 30 MIN: CPT

## 2025-08-19 RX ORDER — TIOTROPIUM BROMIDE INHALATION SPRAY 3.12 UG/1
2.5 SPRAY, METERED RESPIRATORY (INHALATION)
Refills: 0 | Status: ACTIVE | COMMUNITY
Start: 2025-08-19

## 2025-08-19 RX ORDER — BUDESONIDE AND FORMOTEROL FUMARATE DIHYDRATE 160; 4.5 UG/1; UG/1
AEROSOL RESPIRATORY (INHALATION)
Refills: 0 | Status: ACTIVE | COMMUNITY

## 2025-08-19 RX ORDER — FLUTICASONE PROPIONATE 50 MCG
50 SPRAY, SUSPENSION (ML) NASAL
Refills: 0 | Status: ACTIVE | COMMUNITY
Start: 2025-08-19

## 2025-08-19 RX ORDER — SELEGILINE HYDROCHLORIDE 5 MG/1
TABLET ORAL
Refills: 0 | Status: ACTIVE | COMMUNITY

## 2025-08-21 ENCOUNTER — APPOINTMENT (OUTPATIENT)
Dept: THORACIC SURGERY | Facility: CLINIC | Age: 80
End: 2025-08-21
Payer: MEDICARE

## 2025-08-21 VITALS
HEIGHT: 60 IN | SYSTOLIC BLOOD PRESSURE: 134 MMHG | OXYGEN SATURATION: 92 % | BODY MASS INDEX: 20.62 KG/M2 | DIASTOLIC BLOOD PRESSURE: 77 MMHG | RESPIRATION RATE: 20 BRPM | HEART RATE: 85 BPM | WEIGHT: 105 LBS

## 2025-08-21 PROCEDURE — 99214 OFFICE O/P EST MOD 30 MIN: CPT

## 2025-09-05 ENCOUNTER — RESULT REVIEW (OUTPATIENT)
Age: 80
End: 2025-09-05

## 2025-09-05 ENCOUNTER — APPOINTMENT (OUTPATIENT)
Dept: THORACIC SURGERY | Facility: HOSPITAL | Age: 80
End: 2025-09-05

## 2025-09-05 ENCOUNTER — TRANSCRIPTION ENCOUNTER (OUTPATIENT)
Age: 80
End: 2025-09-05

## 2025-09-09 ENCOUNTER — TRANSCRIPTION ENCOUNTER (OUTPATIENT)
Age: 80
End: 2025-09-09

## 2025-09-23 PROBLEM — F41.9 ANXIETY: Status: ACTIVE | Noted: 2025-09-23

## 2025-09-23 PROBLEM — C34.92 ADENOCARCINOMA OF LEFT LUNG: Status: ACTIVE | Noted: 2025-09-23
